# Patient Record
Sex: MALE | Race: WHITE | Employment: FULL TIME | URBAN - METROPOLITAN AREA
[De-identification: names, ages, dates, MRNs, and addresses within clinical notes are randomized per-mention and may not be internally consistent; named-entity substitution may affect disease eponyms.]

---

## 2019-05-24 ENCOUNTER — THERAPY VISIT (OUTPATIENT)
Dept: PHYSICAL THERAPY | Facility: CLINIC | Age: 29
End: 2019-05-24
Payer: COMMERCIAL

## 2019-05-24 DIAGNOSIS — M54.41 CHRONIC BILATERAL LOW BACK PAIN WITH RIGHT-SIDED SCIATICA: ICD-10-CM

## 2019-05-24 DIAGNOSIS — G89.29 CHRONIC BILATERAL LOW BACK PAIN WITH RIGHT-SIDED SCIATICA: ICD-10-CM

## 2019-05-24 PROCEDURE — 97110 THERAPEUTIC EXERCISES: CPT | Mod: GP | Performed by: PHYSICAL THERAPIST

## 2019-05-24 PROCEDURE — 97140 MANUAL THERAPY 1/> REGIONS: CPT | Mod: GP | Performed by: PHYSICAL THERAPIST

## 2019-05-24 PROCEDURE — 97162 PT EVAL MOD COMPLEX 30 MIN: CPT | Mod: GP | Performed by: PHYSICAL THERAPIST

## 2019-05-24 NOTE — PROGRESS NOTES
[facility] Initial Evaluation  Subjective:  The history is provided by the patient. No  was used.   Gamal Ortiz is a 29 year old male with a lumbar condition.  Condition occurred with:  Repetition/overuse.  Condition occurred: at home.  This is a chronic condition  Has hx of fracture at low back fracture in high school. Also overuse when in [employer] (9 years). Has been having more difficulty sleeping lately. Saw spine specialist 4/26/19 who referred to PT.   .    Patient reports pain:  Central lumbar spine and SI joint right.  Radiates to:  Gluteals right and thigh right.  Pain is described as aching and sharp and is constant and reported as 7/10.  Associated symptoms:  Loss of motion/stiffness. Pain is worse in the A.M..  Symptoms are exacerbated by lifting, bending, certain positions and walking (prolonged sitting) and relieved by analgesics, ice and NSAID's (gabapentin).  Since onset symptoms are unchanged.  Special tests:  MRI (per pt multi-level disc herniations and degeneration).  Previous treatment includes chiropractic (many years ago).  There was moderate (temporary) improvement following previous treatment.  General health as reported by patient is good.  Pertinent medical history includes:  Migraines/headaches.  Medical allergies: no.  Other surgeries include:  No.  Current medications:  None as reported by the patient.  Current occupation is Sales/ warehouse; Lives w/ girlfriend in house w/ stairs;1 great ginna, 2 horses, and 2 cats; Hobbies include golfing and sedentary hobbies..        Barriers include:  None as reported by the patient.    Red flags:  None as reported by the patient.                        Objective:  Standing Alignment:    Cervical/Thoracic:  Thoracic kyphosis increased    Lumbar:  Lordosis decr and posterior pelvic tilt            Gait:    Gait Type:  Antalgic     Deviations:  Hip:  Trendelenberg R               Lumbar/SI Evaluation  ROM:     AROM Lumbar:   Flexion:            To ankles ++pain low back   Ext:                    Mod limitation +++pain central   Side Bend:        Left:  To lower thigh ++pull/ pain R    Right:  To knee  Rotation:           Left:  Mod limitation +pull central    Right:  Mod limitation +pull central  Side Glide:        Left:     Right:           Lumbar Myotomes:  normal (All levels WNL/ EQ B when tested in sitting )                  Neural Tension/Mobility:      Left side:SLR or Slump  negative.     Right side:   Slump or SLR  negative.           SI joint/Sacrum:    (+) Downslip R  (+) OSVALDO R (Pain in R groin and R SI)  (+) OSVALDO L (Pain in L groin and R SI)  (+) Active SLR R (Hip Flex L 4+/5, R 3/5)  (+) Sacral torsion R  (+) Anterior innominate R/ posterior L                                                   After MET:   (-) Active SLR  (-) OSVALDO B  Lumbar flex to toes ++pull B LE  Lumbar ext 25% limited +pain  SB To below knee B    General     ROS    Assessment/Plan:    Patient is a 29 year old male with lumbar complaints.    Patient has the following significant findings with corresponding treatment plan.                Diagnosis 1:  Low Back Pain  Pain -  hot/cold therapy, electric stimulation, mechanical traction, manual therapy, self management, education, directional preference exercise and home program  Decreased ROM/flexibility - manual therapy, therapeutic exercise, therapeutic activity and home program  Decreased joint mobility - manual therapy, therapeutic exercise, therapeutic activity and home program  Decreased strength - therapeutic exercise, therapeutic activities and home program  Impaired gait - gait training and home program  Impaired muscle performance - neuro re-education and home program  Decreased function - therapeutic activities and home program  Impaired posture - neuro re-education, therapeutic activities and home program    Therapy Evaluation Codes:   1) History comprised of:   Personal  factors that impact the plan of care:      Past/current experiences, Profession and Time since onset of symptoms.    Comorbidity factors that impact the plan of care are:      Migraines/headaches.     Medications impacting care: None.  2) Examination of Body Systems comprised of:   Body structures and functions that impact the plan of care:      Lumbar spine and Sacral illiac joint.   Activity limitations that impact the plan of care are:      Bathing, Bending, Cooking, Driving, Dressing, Jumping, Lifting, Running, Sitting, Sports, Squatting/kneeling, Standing, Walking, Working, Sleeping and Laying down.  3) Clinical presentation characteristics are:   Evolving/Changing.  4) Decision-Making    Moderate complexity using standardized patient assessment instrument and/or measureable assessment of functional outcome.  Cumulative Therapy Evaluation is: Moderate complexity.    Previous and current functional limitations:  (See Goal Flow Sheet for this information)    Short term and Long term goals: (See Goal Flow Sheet for this information)     Communication ability:  Patient appears to be able to clearly communicate and understand verbal and written communication and follow directions correctly.  Treatment Explanation - The following has been discussed with the patient:   RX ordered/plan of care  Anticipated outcomes  Possible risks and side effects  This patient would benefit from PT intervention to resume normal activities.   Rehab potential is good.    Frequency:  1 X week, once daily  Duration:  for 8 weeks  Discharge Plan:  Achieve all LTG.  Independent in home treatment program.  Reach maximal therapeutic benefit.    Please refer to the daily flowsheet for treatment today, total treatment time and time spent performing 1:1 timed codes.

## 2019-05-24 NOTE — LETTER
[facility] ATHLETIC [facility] PHYSICAL [facility]  [address]  [facility] [id number] [phone]    May 28, 2019    Re: Gmaal Ortiz   :   1990  MRN:  [id number]   REFERRING PHYSICIAN:   Onesimo Stout MD    [facility] ATHLETIC [facility]    Date of Initial Evaluation: 2019  Visits:  Rxs Used: 1  Reason for Referral:  Chronic bilateral low back pain with right-sided sciatica    EVALUATION SUMMARY    [facility] Initial Evaluation  Subjective:  The history is provided by the patient. No  was used.   Gamal Ortiz is a 29 year old male with a lumbar condition.  Condition occurred with:  Repetition/overuse.  Condition occurred: at home.  This is a chronic condition  Has hx of fracture at low back fracture in high school. Also overuse when in [employer] (9 years). Has been having more difficulty sleeping lately. Saw spine specialist 19 who referred to PT.   .    Patient reports pain:  Central lumbar spine and SI joint right.  Radiates to:  Gluteals right and thigh right.  Pain is described as aching and sharp and is constant and reported as 7/10.  Associated symptoms:  Loss of motion/stiffness. Pain is worse in the A.M..  Symptoms are exacerbated by lifting, bending, certain positions and walking (prolonged sitting) and relieved by analgesics, ice and NSAID's (gabapentin).  Since onset symptoms are unchanged.  Special tests:  MRI (per pt multi-level disc herniations and degeneration).  Previous treatment includes chiropractic (many years ago).  There was moderate (temporary) improvement following previous treatment.  General health as reported by patient is good.  Pertinent medical history includes:  Migraines/headaches.  Medical allergies: no.  Other surgeries include:  No.  Current medications:  None as reported by the patient.  Current occupation is Sales/ warehouse; Lives w/ girlfriend in  house w/ stairs. 1 great ginna, 2 horses, and 2 cats; Hobbies include golfing and sedentary hobbies.        Barriers include:  None as reported by the patient.  Red flags:  None as reported by the patient.    Objective:  Standing Alignment:    Cervical/Thoracic:  Thoracic kyphosis increased  Lumbar:  Lordosis decr and posterior pelvic tilt    Gait:    Gait Type:  Antalgic     Deviations:  Hip:  Trendelenberg R        Re: Gamal Ortiz   :   1990    Lumbar/SI Evaluation  ROM:    AROM Lumbar:   Flexion:            To ankles ++pain low back   Ext:                    Mod limitation +++pain central   Side Bend:        Left:  To lower thigh ++pull/ pain R    Right:  To knee  Rotation:           Left:  Mod limitation +pull central    Right:  Mod limitation +pull central  Side Glide:        Left:     Right:        Lumbar Myotomes:  normal (All levels WNL/ EQ B when tested in sitting )    Neural Tension/Mobility:    Left side:SLR or Slump  negative.   Right side:   Slump or SLR  negative.     SI joint/Sacrum:    (+) Downslip R  (+) OSVALDO R (Pain in R groin and R SI)  (+) OSVALDO L (Pain in L groin and R SI)  (+) Active SLR R (Hip Flex L 4+/5, R 3/5)  (+) Sacral torsion R  (+) Anterior innominate R/ posterior L    After MET:   (-) Active SLR  (-) OSVALDO B  Lumbar flex to toes ++pull B LE  Lumbar ext 25% limited +pain  SB To below knee B    Assessment/Plan:    Patient is a 29 year old male with lumbar complaints.    Patient has the following significant findings with corresponding treatment plan.                Diagnosis 1:  Low Back Pain  Pain -  hot/cold therapy, electric stimulation, mechanical traction, manual therapy, self management, education, directional preference exercise and home program  Decreased ROM/flexibility - manual therapy, therapeutic exercise, therapeutic activity and home program  Decreased joint mobility - manual therapy, therapeutic exercise, therapeutic activity and home program  Decreased  strength - therapeutic exercise, therapeutic activities and home program  Impaired gait - gait training and home program  Impaired muscle performance - neuro re-education and home program  Decreased function - therapeutic activities and home program  Impaired posture - neuro re-education, therapeutic activities and home program                Re: Gamal Ortiz   :   1990    Therapy Evaluation Codes:   1) History comprised of:   Personal factors that impact the plan of care:      Past/current experiences, Profession and Time since onset of symptoms.    Comorbidity factors that impact the plan of care are:      Migraines/headaches.     Medications impacting care: None.  2) Examination of Body Systems comprised of:   Body structures and functions that impact the plan of care:      Lumbar spine and Sacral illiac joint.   Activity limitations that impact the plan of care are:      Bathing, Bending, Cooking, Driving, Dressing, Jumping, Lifting, Running, Sitting, Sports, Squatting/kneeling, Standing, Walking, Working, Sleeping and Laying down.  3) Clinical presentation characteristics are:   Evolving/Changing.  4) Decision-Making    Moderate complexity using standardized patient assessment instrument and/or measureable assessment of functional outcome.  Cumulative Therapy Evaluation is: Moderate complexity.    Previous and current functional limitations:  (See Goal Flow Sheet for this information)    Short term and Long term goals: (See Goal Flow Sheet for this information)     Communication ability:  Patient appears to be able to clearly communicate and understand verbal and written communication and follow directions correctly.  Treatment Explanation - The following has been discussed with the patient:   RX ordered/plan of care  Anticipated outcomes  Possible risks and side effects  This patient would benefit from PT intervention to resume normal activities.   Rehab potential is good.    Frequency:  1 X  week, once daily  Duration:  for 8 weeks  Discharge Plan:  Achieve all LTG.  Independent in home treatment program.  Reach maximal therapeutic benefit.    Thank you for your referral.    INQUIRIES  Therapist: Amanda Hilligoss, PT  [facility]  [address]  [facility] [address]  Phone: [phone]  Fax: [phone]

## 2019-09-19 PROBLEM — G89.29 CHRONIC BILATERAL LOW BACK PAIN WITH RIGHT-SIDED SCIATICA: Status: RESOLVED | Noted: 2019-05-24 | Resolved: 2019-09-19

## 2019-09-19 PROBLEM — M54.41 CHRONIC BILATERAL LOW BACK PAIN WITH RIGHT-SIDED SCIATICA: Status: RESOLVED | Noted: 2019-05-24 | Resolved: 2019-09-19
